# Patient Record
Sex: MALE
[De-identification: names, ages, dates, MRNs, and addresses within clinical notes are randomized per-mention and may not be internally consistent; named-entity substitution may affect disease eponyms.]

---

## 2017-01-12 ENCOUNTER — APPOINTMENT (OUTPATIENT)
Dept: UROLOGY | Facility: CLINIC | Age: 57
End: 2017-01-12

## 2017-01-12 DIAGNOSIS — R35.0 FREQUENCY OF MICTURITION: ICD-10-CM

## 2017-01-12 DIAGNOSIS — F52.0 HYPOACTIVE SEXUAL DESIRE DISORDER: ICD-10-CM

## 2017-01-12 DIAGNOSIS — M62.08 SEPARATION OF MUSCLE (NONTRAUMATIC), OTHER SITE: ICD-10-CM

## 2017-01-15 LAB
APPEARANCE: ABNORMAL
BACTERIA UR CULT: NORMAL
BACTERIA: NEGATIVE
BILIRUBIN URINE: NEGATIVE
BLOOD URINE: NEGATIVE
COLOR: ABNORMAL
GLUCOSE QUALITATIVE U: NORMAL MG/DL
HYALINE CASTS: 0 /LPF
KETONES URINE: ABNORMAL
LEUKOCYTE ESTERASE URINE: NEGATIVE
MICROSCOPIC-UA: NORMAL
NITRITE URINE: NEGATIVE
PH URINE: 5.5
PROTEIN URINE: NEGATIVE MG/DL
PSA SERPL-MCNC: 1.88 NG/ML
RED BLOOD CELLS URINE: 0 /HPF
SPECIFIC GRAVITY URINE: 1.03
SQUAMOUS EPITHELIAL CELLS: 0 /HPF
URINE COMMENTS: NORMAL
UROBILINOGEN URINE: 1 MG/DL
WHITE BLOOD CELLS URINE: 1 /HPF

## 2017-01-22 LAB — CORE LAB FLUID CYTOLOGY: NORMAL

## 2020-02-27 ENCOUNTER — APPOINTMENT (OUTPATIENT)
Dept: UROLOGY | Facility: CLINIC | Age: 60
End: 2020-02-27
Payer: COMMERCIAL

## 2020-02-27 VITALS
TEMPERATURE: 97.7 F | HEART RATE: 66 BPM | RESPIRATION RATE: 17 BRPM | DIASTOLIC BLOOD PRESSURE: 89 MMHG | BODY MASS INDEX: 24.92 KG/M2 | WEIGHT: 184 LBS | SYSTOLIC BLOOD PRESSURE: 134 MMHG | HEIGHT: 72 IN

## 2020-02-27 DIAGNOSIS — N13.8 BENIGN PROSTATIC HYPERPLASIA WITH LOWER URINARY TRACT SYMPMS: ICD-10-CM

## 2020-02-27 DIAGNOSIS — N43.3 HYDROCELE, UNSPECIFIED: ICD-10-CM

## 2020-02-27 DIAGNOSIS — N39.41 URGE INCONTINENCE: ICD-10-CM

## 2020-02-27 DIAGNOSIS — Z87.438 PERSONAL HISTORY OF OTHER DISEASES OF MALE GENITAL ORGANS: ICD-10-CM

## 2020-02-27 DIAGNOSIS — N40.1 BENIGN PROSTATIC HYPERPLASIA WITH LOWER URINARY TRACT SYMPMS: ICD-10-CM

## 2020-02-27 DIAGNOSIS — Z80.42 FAMILY HISTORY OF MALIGNANT NEOPLASM OF PROSTATE: ICD-10-CM

## 2020-02-27 PROCEDURE — 99204 OFFICE O/P NEW MOD 45 MIN: CPT

## 2020-02-27 RX ORDER — ALFUZOSIN HYDROCHLORIDE 10 MG/1
10 TABLET, EXTENDED RELEASE ORAL
Qty: 30 | Refills: 11 | Status: ACTIVE | COMMUNITY
Start: 2020-02-27 | End: 1900-01-01

## 2020-02-27 NOTE — REVIEW OF SYSTEMS
[Dry Eyes] : dryness of the eyes [see HPI] : see HPI [Seen by urologist before (Name)  ___] : Preciously seen by a urologist: [unfilled] [Strong urge to urinate] : strong urge to urinate [Wake up at night to urinate  How many times?  ___] : wakes up to urinate [unfilled] times during the night [Negative] : Heme/Lymph

## 2020-02-27 NOTE — LETTER BODY
[Dear  ___] : Dear  [unfilled], [Consult Letter:] : I had the pleasure of evaluating your patient, [unfilled]. [Consult Closing:] : Thank you very much for allowing me to participate in the care of this patient.  If you have any questions, please do not hesitate to contact me. [Please see my note below.] : Please see my note below. [Sincerely,] : Sincerely, [___] : [unfilled] [DrVickie  ___] : Dr. MARISCAL [FreeTextEntry2] : Pedro Trujillo MD\par [address] \[place] [address] [FreeTextEntry3] : Rizwan Gannon M.D. PhD  [FreeTextEntry1] : 02/27/2020:SUSANA BOND is a 60 year old male who presents for initial evaluation of the prostate. The patient reports urgent incontinence and urinary urgency. Patient denies taking any medications. His erections and sexual functioning are satisfactory. Of note, the patient had an inguinal hernia in 2012 and right shoulder arthroscopy in 2000. The patient has a fhx of prostate cancer from his father.\par \par The patient produced a urine sample which will be sent for urinalysis, urine cytology, and urine culture. \par \par Digital rectal exam found no suspicious rectal masses. Anal tone is normal. The prostate is non tender, with normal texture, discrete borders, and no nodules. It is a 25 gram transurethral resection size. No gross blood on the examining finger.\par \par I prescribed Alfuzosin ER 10mg , once daily after a meal. I informed the patient of lightheadedness as a side effect. \par \par Patient should follow up in 3 months to discuss progress with Alfuzosin or sooner if he experiences urinary difficulties.

## 2020-02-27 NOTE — PHYSICAL EXAM
[General Appearance - Well Nourished] : well nourished [General Appearance - Well Developed] : well developed [Penis Abnormality] : normal uncircumcised penis [Testes Tenderness] : no tenderness of the testes [Epididymis] : the epididymides were normal [Skin Color & Pigmentation] : normal skin color and pigmentation [Skin Turgor] : supple [Testes Mass (___cm)] : there were no testicular masses [Prostate Tenderness] : the prostate was not tender [Prostate Size ___ gm] : prostate size [unfilled] gm [Abdomen Soft] : soft [Normal Station and Gait] : the gait and station were normal for the patient's age [] : no rash [No Focal Deficits] : no focal deficits [Oriented To Time, Place, And Person] : oriented to person, place, and time [Affect] : the affect was normal [Mood] : the mood was normal [Not Anxious] : not anxious [FreeTextEntry1] : no balanitis,  left hydrocele testicles palpable, right testicle has a small hydrocele, epididymis are non tender on both sides.  Digital rectal exam found no suspicious rectal masses. Anal tone is normal. The prostate is non tender, with normal texture, discrete borders, and no nodules. It is a 25 gram transurethral resection size. No gross blood on the examining finger.

## 2020-02-27 NOTE — ADDENDUM
[FreeTextEntry1] : This note was authored by Mojgan Walter, working as scribe for Dr. Rizwan Gannon.\par \par I, Dr. Rizwan Gannon, have have reviewed the content of this note and confirm it is true and accurate. I personally performed the history and physical examination and made all the decisions. \par \par 02/27/2020

## 2020-02-27 NOTE — ASSESSMENT
[FreeTextEntry1] : 02/27/2020:SUSANA BOND is a 60 year old male who presents for initial evaluation of the prostate. The patient reports urgent incontinence and urinary urgency. Patient denies taking any medications. His erections and sexual functioning are satisfactory. Of note, the patient had an inguinal hernia in 2012 and right shoulder arthroscopy in 2000. The patient has a fhx of prostate cancer from his father.\par \par The patient produced a urine sample which will be sent for urinalysis, urine cytology, and urine culture. \par \par Digital rectal exam found no suspicious rectal masses. Anal tone is normal. The prostate is non tender, with normal texture, discrete borders, and no nodules. It is a 25 gram transurethral resection size. No gross blood on the examining finger.\par \par I prescribed Alfuzosin ER 10mg , once daily after a meal. I informed the patient of lightheadedness as a side effect. \par \par Patient should follow up in 3 months to discuss progress with Alfuzosin or sooner if he experiences urinary difficulties.

## 2020-02-27 NOTE — HISTORY OF PRESENT ILLNESS
[FreeTextEntry1] : 02/27/2020:SUSANA BOND is a 60 year old male who presents for initial evaluation of the prostate. The patient reports urgent incontinence and urinary urgency after long period of retention. Patient denies taking any medications. His erections and sexual functioning are satisfactory. Of note, the patient had an Inguinal Hernia in 2012 and Right shoulder arthroscopy in 2000. The patient has a fhx of prostate cancer from his father.

## 2020-03-01 LAB
APPEARANCE: CLEAR
BILIRUBIN URINE: NEGATIVE
BLOOD URINE: NEGATIVE
COLOR: YELLOW
GLUCOSE QUALITATIVE U: NEGATIVE
KETONES URINE: NORMAL
LEUKOCYTE ESTERASE URINE: NEGATIVE
NITRITE URINE: NEGATIVE
PH URINE: 7.5
PROTEIN URINE: NORMAL
SPECIFIC GRAVITY URINE: 1.02
URINE CYTOLOGY: NORMAL
UROBILINOGEN URINE: NORMAL

## 2020-03-09 ENCOUNTER — APPOINTMENT (OUTPATIENT)
Dept: UROLOGY | Facility: CLINIC | Age: 60
End: 2020-03-09

## 2020-06-01 ENCOUNTER — APPOINTMENT (OUTPATIENT)
Dept: UROLOGY | Facility: CLINIC | Age: 60
End: 2020-06-01

## 2021-02-17 ENCOUNTER — RESULT REVIEW (OUTPATIENT)
Age: 61
End: 2021-02-17

## 2021-04-14 ENCOUNTER — APPOINTMENT (OUTPATIENT)
Dept: UROLOGY | Facility: CLINIC | Age: 61
End: 2021-04-14

## 2022-03-31 ENCOUNTER — NON-APPOINTMENT (OUTPATIENT)
Age: 62
End: 2022-03-31

## 2022-03-31 ENCOUNTER — APPOINTMENT (OUTPATIENT)
Dept: ELECTROPHYSIOLOGY | Facility: CLINIC | Age: 62
End: 2022-03-31
Payer: COMMERCIAL

## 2022-03-31 VITALS
HEART RATE: 70 BPM | HEIGHT: 72 IN | WEIGHT: 173 LBS | BODY MASS INDEX: 23.43 KG/M2 | DIASTOLIC BLOOD PRESSURE: 78 MMHG | OXYGEN SATURATION: 99 % | SYSTOLIC BLOOD PRESSURE: 123 MMHG

## 2022-03-31 DIAGNOSIS — R00.2 PALPITATIONS: ICD-10-CM

## 2022-03-31 PROCEDURE — 99204 OFFICE O/P NEW MOD 45 MIN: CPT

## 2022-03-31 PROCEDURE — 93000 ELECTROCARDIOGRAM COMPLETE: CPT

## 2022-03-31 NOTE — DISCUSSION/SUMMARY
[FreeTextEntry1] : Impression:\par \par 1. Palpitations: EKG performed today to assess for presence of conduction disease and reveals NSR. Recommend undergoing 30 day CardioNet to assess for presence of tachyarrhythmias or ectopy associated with symptoms. \par \par Plan for 30 day CardioNet.

## 2022-03-31 NOTE — HISTORY OF PRESENT ILLNESS
[FreeTextEntry1] : Mika Dexter is a 61y/o man with Hx of BPH and recurring palpitations who presents today for initial evaluation. Admits feeling occasional episodes of palpitations with unknown causative factors. Episodes occur sometimes weekly but not daily. Typically are brief in duration and resolved with rest. Exercises and stays active without issues or symptoms. Saw Cardiologist and underwent Holter without evidence of tachyarrhythmias, only vagal pauses noted during sleep. Pending ECHO next month. Denies chest pain, SOB, syncope or near syncope.\par

## 2024-12-04 ENCOUNTER — APPOINTMENT (OUTPATIENT)
Dept: OTOLARYNGOLOGY | Facility: CLINIC | Age: 64
End: 2024-12-04
Payer: COMMERCIAL

## 2024-12-04 ENCOUNTER — NON-APPOINTMENT (OUTPATIENT)
Age: 64
End: 2024-12-04

## 2024-12-04 VITALS
BODY MASS INDEX: 25.48 KG/M2 | HEIGHT: 71 IN | DIASTOLIC BLOOD PRESSURE: 94 MMHG | SYSTOLIC BLOOD PRESSURE: 152 MMHG | WEIGHT: 182 LBS | HEART RATE: 74 BPM

## 2024-12-04 DIAGNOSIS — Z78.9 OTHER SPECIFIED HEALTH STATUS: ICD-10-CM

## 2024-12-04 DIAGNOSIS — Z87.891 PERSONAL HISTORY OF NICOTINE DEPENDENCE: ICD-10-CM

## 2024-12-04 DIAGNOSIS — Z80.42 FAMILY HISTORY OF MALIGNANT NEOPLASM OF PROSTATE: ICD-10-CM

## 2024-12-04 DIAGNOSIS — H93.8X1 OTHER SPECIFIED DISORDERS OF RIGHT EAR: ICD-10-CM

## 2024-12-04 DIAGNOSIS — H90.3 SENSORINEURAL HEARING LOSS, BILATERAL: ICD-10-CM

## 2024-12-04 PROCEDURE — 92567 TYMPANOMETRY: CPT

## 2024-12-04 PROCEDURE — 92557 COMPREHENSIVE HEARING TEST: CPT

## 2024-12-04 PROCEDURE — 99204 OFFICE O/P NEW MOD 45 MIN: CPT | Mod: 25

## 2024-12-04 RX ORDER — GLIPIZIDE 5 MG/1
5 TABLET ORAL
Refills: 0 | Status: ACTIVE | COMMUNITY

## 2024-12-04 RX ORDER — FLUOCINOLONE ACETONIDE 0.11 MG/ML
0.01 OIL AURICULAR (OTIC) DAILY
Qty: 1 | Refills: 2 | Status: ACTIVE | COMMUNITY
Start: 2024-12-04 | End: 1900-01-01